# Patient Record
Sex: MALE | Race: WHITE | NOT HISPANIC OR LATINO | Employment: FULL TIME | URBAN - METROPOLITAN AREA
[De-identification: names, ages, dates, MRNs, and addresses within clinical notes are randomized per-mention and may not be internally consistent; named-entity substitution may affect disease eponyms.]

---

## 2018-11-21 ENCOUNTER — HOSPITAL ENCOUNTER (OUTPATIENT)
Dept: RADIOLOGY | Facility: MEDICAL CENTER | Age: 58
End: 2018-11-21
Attending: UROLOGY
Payer: COMMERCIAL

## 2018-11-21 DIAGNOSIS — C61 MALIGNANT NEOPLASM OF PROSTATE (HCC): ICD-10-CM

## 2018-11-21 PROCEDURE — A9585 GADOBUTROL INJECTION: HCPCS | Performed by: UROLOGY

## 2018-11-21 PROCEDURE — 700111 HCHG RX REV CODE 636 W/ 250 OVERRIDE (IP): Performed by: RADIOLOGY

## 2018-11-21 PROCEDURE — 72197 MRI PELVIS W/O & W/DYE: CPT

## 2018-11-21 PROCEDURE — 700117 HCHG RX CONTRAST REV CODE 255: Performed by: UROLOGY

## 2018-11-21 RX ORDER — GADOBUTROL 604.72 MG/ML
10 INJECTION INTRAVENOUS ONCE
Status: COMPLETED | OUTPATIENT
Start: 2018-11-21 | End: 2018-11-21

## 2018-11-21 RX ADMIN — GADOBUTROL 10 ML: 604.72 INJECTION INTRAVENOUS at 17:35

## 2018-11-21 RX ADMIN — GLUCAGON HYDROCHLORIDE 1 MG: KIT at 17:00

## 2022-09-02 ENCOUNTER — HOSPITAL ENCOUNTER (OUTPATIENT)
Dept: RADIOLOGY | Facility: MEDICAL CENTER | Age: 62
End: 2022-09-02
Attending: UROLOGY
Payer: COMMERCIAL

## 2022-09-02 DIAGNOSIS — C61 MALIGNANT NEOPLASM OF PROSTATE (HCC): ICD-10-CM

## 2022-09-02 PROCEDURE — A9595 CT-PETCT-PROSTATE SCAN SKULL BASE TO MID-THIGH (PYLARIFY OR AXUMIN): HCPCS

## 2023-06-16 ENCOUNTER — APPOINTMENT (OUTPATIENT)
Dept: RADIOLOGY | Facility: IMAGING CENTER | Age: 63
End: 2023-06-16
Attending: NURSE PRACTITIONER
Payer: COMMERCIAL

## 2023-06-16 ENCOUNTER — OFFICE VISIT (OUTPATIENT)
Dept: URGENT CARE | Facility: CLINIC | Age: 63
End: 2023-06-16
Payer: COMMERCIAL

## 2023-06-16 VITALS
RESPIRATION RATE: 18 BRPM | BODY MASS INDEX: 31.64 KG/M2 | HEART RATE: 91 BPM | SYSTOLIC BLOOD PRESSURE: 128 MMHG | TEMPERATURE: 98.6 F | HEIGHT: 70 IN | WEIGHT: 221 LBS | DIASTOLIC BLOOD PRESSURE: 86 MMHG | OXYGEN SATURATION: 99 %

## 2023-06-16 DIAGNOSIS — S92.535A NONDISPLACED FRACTURE OF DISTAL PHALANX OF LEFT LESSER TOE(S), INITIAL ENCOUNTER FOR CLOSED FRACTURE: ICD-10-CM

## 2023-06-16 DIAGNOSIS — T14.8XXA BLISTER: ICD-10-CM

## 2023-06-16 DIAGNOSIS — S97.102A CRUSHING INJURY OF TOE OF LEFT FOOT, INITIAL ENCOUNTER: ICD-10-CM

## 2023-06-16 PROBLEM — C61 PROSTATE CANCER (HCC): Status: ACTIVE | Noted: 2020-11-13

## 2023-06-16 PROBLEM — F32.A ANXIETY AND DEPRESSION: Status: ACTIVE | Noted: 2020-11-13

## 2023-06-16 PROBLEM — N64.4 BREAST PAIN IN MALE: Status: ACTIVE | Noted: 2021-07-13

## 2023-06-16 PROBLEM — N40.1 BENIGN PROSTATIC HYPERPLASIA WITH LOWER URINARY TRACT SYMPTOMS: Status: ACTIVE | Noted: 2020-11-13

## 2023-06-16 PROBLEM — R59.1 GENERALIZED LYMPHADENOPATHY: Status: ACTIVE | Noted: 2023-03-09

## 2023-06-16 PROBLEM — M13.0 POLYARTHRITIS: Status: ACTIVE | Noted: 2020-11-13

## 2023-06-16 PROBLEM — R31.29 MICROHEMATURIA: Status: ACTIVE | Noted: 2020-11-13

## 2023-06-16 PROBLEM — F32.1 CURRENT MODERATE EPISODE OF MAJOR DEPRESSIVE DISORDER WITHOUT PRIOR EPISODE (HCC): Status: ACTIVE | Noted: 2020-11-13

## 2023-06-16 PROBLEM — R97.21 INCREASING PROSTATE SPECIFIC ANTIGEN (PSA) LEVEL AFTER TREATMENT FOR MALIGNANT NEOPLASM OF PROSTATE: Status: ACTIVE | Noted: 2023-03-09

## 2023-06-16 PROBLEM — N52.9 ERECTILE DYSFUNCTION: Status: ACTIVE | Noted: 2020-10-28

## 2023-06-16 PROBLEM — F41.9 ANXIETY AND DEPRESSION: Status: ACTIVE | Noted: 2020-11-13

## 2023-06-16 PROCEDURE — 3074F SYST BP LT 130 MM HG: CPT | Performed by: NURSE PRACTITIONER

## 2023-06-16 PROCEDURE — 3079F DIAST BP 80-89 MM HG: CPT | Performed by: NURSE PRACTITIONER

## 2023-06-16 PROCEDURE — 73660 X-RAY EXAM OF TOE(S): CPT | Mod: TC,LT | Performed by: RADIOLOGY

## 2023-06-16 PROCEDURE — 99203 OFFICE O/P NEW LOW 30 MIN: CPT | Performed by: NURSE PRACTITIONER

## 2023-06-16 RX ORDER — TAMSULOSIN HYDROCHLORIDE 0.4 MG/1
CAPSULE ORAL
COMMUNITY
Start: 2023-05-16

## 2023-06-16 RX ORDER — ENZALUTAMIDE 40 MG/1
CAPSULE ORAL
COMMUNITY
Start: 2023-03-31

## 2023-06-16 RX ORDER — CEPHALEXIN 500 MG/1
500 CAPSULE ORAL 4 TIMES DAILY
Qty: 20 CAPSULE | Refills: 0 | Status: SHIPPED | OUTPATIENT
Start: 2023-06-16 | End: 2023-06-21

## 2023-06-16 ASSESSMENT — ENCOUNTER SYMPTOMS
NUMBNESS: 1
JOINT SWELLING: 1

## 2023-06-16 NOTE — LETTER
June 16, 2023         Patient: Matt White   YOB: 1960   Date of Visit: 6/16/2023           To Whom it May Concern:    Matt White was seen in my clinic on 6/16/2023. He may return to work on 06/16/2023. Recommendation is to rest foot as needed for 1 week. Preferred light duty, desk work as needed.    If you have any questions or concerns, please don't hesitate to call.        Sincerely,           PILAR Bruno.  Electronically Signed

## 2023-06-16 NOTE — PATIENT INSTRUCTIONS
- continue pravastatin   -NSAID's (ibuprofen) and tylenol as directed for pain and inflammation.   -RICE Therapy: Rest, Ice, Compression, Elevation  -Gently clean area with mild soap and water. Can cover blister with dressing for added protection. Keep ruptured blisters covered with dressings (using silver sulfadiazine or a petrolatum impregnated gauze).   -Julianna tape with cotton or gauze between toes. Monitor for skin breakdown.  -Post op shoe or hard soled shoe as tolerated with a wide toe base.   -Ice 15 to 20 minutes every two to three hours for the first 2-3 days.  -Follow up in 2-3 days for wound check.    Immobilize toe with julianna tape until tenderness has resolved, which usually takes four to six weeks. An additional two to four weeks may be needed before tolerating tight-fitting shoes. Follow up sooner for signs of infection (redness, red streaking, pus, foul odor, severe pain, increased swelling or fever) or any other concerns. Follow up emergently for severe uncontrolled pain, neurovascular compromise (decreased sensation, motion, or circulation).

## 2023-06-16 NOTE — PROGRESS NOTES
"  Subjective:     Matt White is a 62 y.o. male who presents for Foot Injury (Left foot injury to middle toe x 4 days)      Dropped wood on his toe on Tuesday. States he had an initial blood blister, has become more swollen this morning. Pain 10/10. Numbness to top of toe. Has used Witchazel, ibuprofen, and oxycodone.     Foot Problem  This is a new problem. The current episode started in the past 7 days. Associated symptoms include joint swelling and numbness. The symptoms are aggravated by walking.       History reviewed. No pertinent past medical history.    History reviewed. No pertinent surgical history.    Social History     Socioeconomic History    Marital status:      Spouse name: Not on file    Number of children: Not on file    Years of education: Not on file    Highest education level: Not on file   Occupational History    Not on file   Tobacco Use    Smoking status: Former     Types: Cigarettes    Smokeless tobacco: Never   Substance and Sexual Activity    Alcohol use: Never    Drug use: Never    Sexual activity: Not on file   Other Topics Concern    Not on file   Social History Narrative    Not on file     Social Determinants of Health     Financial Resource Strain: Not on file   Food Insecurity: Not on file   Transportation Needs: Not on file   Physical Activity: Not on file   Stress: Not on file   Social Connections: Not on file   Intimate Partner Violence: Not on file   Housing Stability: Not on file        History reviewed. No pertinent family history.     No Known Allergies    Review of Systems   Musculoskeletal:  Positive for joint pain and joint swelling.   Neurological:  Positive for numbness.   All other systems reviewed and are negative.       Objective:   /86 (BP Location: Right arm, Patient Position: Standing, BP Cuff Size: Adult)   Pulse 91   Temp 37 °C (98.6 °F) (Temporal)   Resp 18   Ht 1.778 m (5' 10\")   Wt 100 kg (221 lb)   SpO2 99%   BMI 31.71 kg/m² "     Physical Exam  Vitals reviewed.   Cardiovascular:      Pulses:           Dorsalis pedis pulses are 2+ on the left side.   Pulmonary:      Effort: Pulmonary effort is normal. No respiratory distress.   Musculoskeletal:         General: Swelling, tenderness and signs of injury present.      Left foot: Normal capillary refill. Swelling, tenderness and bony tenderness present. No laceration. Normal pulse.      Comments: Left 2nd toe: Blister to dorsal aspect of distal toe, at base of nail. Does no circumference the digit. Skin is intact. Bruising and swelling to distal digit.     Feet:      Left foot:      Skin integrity: Blister and erythema present. No ulcer or skin breakdown.   Skin:     General: Skin is warm and dry.      Capillary Refill: Capillary refill takes less than 2 seconds.      Findings: Bruising present.   Neurological:      Mental Status: He is alert and oriented to person, place, and time.   Psychiatric:         Behavior: Behavior normal.         Assessment/Plan:   1. Crushing injury of toe of left foot, initial encounter  - DX-TOE(S) 2+ LEFT; Future  - cephALEXin (KEFLEX) 500 MG Cap; Take 1 Capsule by mouth 4 times a day for 5 days.  Dispense: 20 Capsule; Refill: 0    2. Nondisplaced fracture of distal phalanx of left lesser toe(s), initial encounter for closed fracture    3. Blister  - cephALEXin (KEFLEX) 500 MG Cap; Take 1 Capsule by mouth 4 times a day for 5 days.  Dispense: 20 Capsule; Refill: 0  - silver sulfADIAZINE (SILVADENE) 1 % Cream; Apply 1 g topically 2 times a day for 7 days.  Dispense: 25 g; Refill: 0    DX-TOE(S) 2+ LEFT    Result Date: 6/16/2023 6/16/2023 8:49 AM HISTORY/REASON FOR EXAM:  Pain/Deformity Following Trauma; 2nd digit TECHNIQUE/EXAM DESCRIPTION AND NUMBER OF VIEWS: 4 of the left toes COMPARISON: None. FINDINGS: There is normal bony mineralization.  There is a comminuted fracture the distal tuft of the left second digit..  There is surrounding soft tissue swelling.      1. Comminuted fracture of distal tuft of left second digit in near anatomic alignment.    -NSAID's (ibuprofen) and tylenol as directed for pain and inflammation.   -RICE Therapy: Rest, Ice, Compression, Elevation  -Gently clean area with mild soap and water. Can cover blister with dressing for added protection. Keep ruptured blisters covered with dressings (using silver sulfadiazine or a petrolatum impregnated gauze).   -Julianna tape with cotton or gauze between toes. Monitor for skin breakdown.  -Post op shoe or hard soled shoe as tolerated with a wide toe base.   -Ice 15 to 20 minutes every two to three hours for the first 2-3 days.  -Follow up in 2-3 days for wound check.    Immobilize toe with julianna tape until tenderness has resolved, which usually takes four to six weeks. An additional two to four weeks may be needed before tolerating tight-fitting shoes. Follow up sooner for signs of infection (redness, red streaking, pus, foul odor, severe pain, increased swelling or fever) or any other concerns. Follow up emergently for severe uncontrolled pain, neurovascular compromise (decreased sensation, motion, or circulation).    -Discussed fracture blister, risks and benefits of draining the blister. Recommendation is to not disrupt the blister, and it does not encompass the digit, and acts as a biological dressing. If blister ruptures, discussed management and tx with silver sulfadiazine. Contingent oral antibiotic for signs of infections. Patient has a f/u appoint scheduled with podiatry on Tuesday.     Differential diagnosis, natural history, supportive care, and indications for immediate follow-up discussed.

## 2023-07-29 ENCOUNTER — OFFICE VISIT (OUTPATIENT)
Dept: URGENT CARE | Facility: CLINIC | Age: 63
End: 2023-07-29
Payer: COMMERCIAL

## 2023-07-29 VITALS
BODY MASS INDEX: 31.64 KG/M2 | WEIGHT: 221 LBS | HEIGHT: 70 IN | DIASTOLIC BLOOD PRESSURE: 70 MMHG | OXYGEN SATURATION: 97 % | TEMPERATURE: 96.9 F | SYSTOLIC BLOOD PRESSURE: 112 MMHG | HEART RATE: 98 BPM | RESPIRATION RATE: 14 BRPM
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
## 2023-07-29 DIAGNOSIS — L03.221 CELLULITIS OF NECK: ICD-10-CM

## 2023-07-29 PROCEDURE — 3078F DIAST BP <80 MM HG: CPT | Performed by: NURSE PRACTITIONER

## 2023-07-29 PROCEDURE — 99213 OFFICE O/P EST LOW 20 MIN: CPT | Performed by: NURSE PRACTITIONER

## 2023-07-29 PROCEDURE — 3074F SYST BP LT 130 MM HG: CPT | Performed by: NURSE PRACTITIONER

## 2023-07-29 RX ORDER — GUSELKUMAB 100 MG/ML
INJECTION SUBCUTANEOUS
COMMUNITY
Start: 2023-07-13

## 2023-07-29 NOTE — PROGRESS NOTES
"Subjective:   Matt White is a 62 y.o. male who presents for Bump (L side of neck, possible insect bite, growing x 48 hours)       HPI  Patient presents for evaluation of an approximate 2-day history of bug bite on the left side of his neck.  Patient reports area has initially been fever, however has become more pruritic, has noticed that he will scratch it absentmindedly.  Is concerned due to appearance of growth in size.  Of note, patient currently is on Augmentin for tested positive for COVID approximately 5 days ago.    ROS  All other systems are negative except as documented above within HPI.    MEDS:   Current Outpatient Medications:     TREMFYA 100 MG/ML Solution Pen-injector, , Disp: , Rfl:     tamsulosin (FLOMAX) 0.4 MG capsule, TAKE 1 CAPSULE BY MOUTH EVERY DAY FOR 90 DAYS, Disp: , Rfl:     Enzalutamide (XTANDI) 40 MG Cap, Xtandi 40 mg capsule, Disp: , Rfl:   ALLERGIES: No Known Allergies    Patient's PMH, SocHx, SurgHx, FamHx, Drug allergies and medications were reviewed.     Objective:   /70 (BP Location: Left arm, Patient Position: Sitting, BP Cuff Size: Adult)   Pulse 98   Temp 36.1 °C (96.9 °F) (Temporal)   Resp 14   Ht 1.778 m (5' 10\")   Wt 100 kg (221 lb)   SpO2 97%   BMI 31.71 kg/m²     Physical Exam  Vitals and nursing note reviewed.   Constitutional:       General: He is awake.      Appearance: Normal appearance. He is well-developed.   HENT:      Head: Normocephalic and atraumatic.      Right Ear: External ear normal.      Left Ear: External ear normal.      Nose: Nose normal.      Mouth/Throat:      Lips: Pink.      Mouth: Mucous membranes are moist.      Pharynx: Oropharynx is clear.   Eyes:      General: Lids are normal.      Extraocular Movements: Extraocular movements intact.      Conjunctiva/sclera: Conjunctivae normal.   Cardiovascular:      Rate and Rhythm: Normal rate and regular rhythm.   Pulmonary:      Effort: Pulmonary effort is normal.   Musculoskeletal:         " General: Normal range of motion.      Cervical back: Normal range of motion.   Skin:     General: Skin is warm and dry.      Findings: Wound present.             Comments: Left lateral neck has erythematic, raised area approximately 2 cm x 1 cm with central crusting and eschar.   Neurological:      Mental Status: He is alert and oriented to person, place, and time.   Psychiatric:         Mood and Affect: Mood normal.         Behavior: Behavior normal. Behavior is cooperative.         Thought Content: Thought content normal.         Judgment: Judgment normal.         Assessment/Plan:   Assessment        1. Bug bite, initial encounter    2. Cellulitis of neck      Vital signs stable at today's acute urgent care visit.  Patient currently is on Augmentin, no need to change antibiotics at this time.  Recommend over-the-counter antihistamine.    Advised the patient to follow-up with the primary care provider/urgent care if symptoms persist.  Red flags discussed and indications to immediately call 911 or present to the ED. All questions were encouraged and answered to the patient's satisfaction and understanding, and they agree to the plan of care.     This is an acute problem with uncertain prognosis, medication management and instructions as well as management options were provided.  I personally reviewed prior external notes and test results pertinent to today and independently reviewed and interpreted all diagnostics, to include POC testing. Time spent evaluating this patient includes preparing for visit, counseling/education, exam, evaluation, obtaining history, and ordering lab/test/procedures.      Please note that this dictation was created using voice recognition software. I have made a reasonable attempt to correct obvious errors, but I expect that there are errors of grammar and possibly content that I did not discover before finalizing the note.